# Patient Record
Sex: FEMALE | Race: WHITE | NOT HISPANIC OR LATINO | URBAN - METROPOLITAN AREA
[De-identification: names, ages, dates, MRNs, and addresses within clinical notes are randomized per-mention and may not be internally consistent; named-entity substitution may affect disease eponyms.]

---

## 2017-10-25 ENCOUNTER — APPOINTMENT (OUTPATIENT)
Dept: WOMENS IMAGING | Facility: HOSPITAL | Age: 52
End: 2017-10-25

## 2017-10-25 PROCEDURE — 77067 SCR MAMMO BI INCL CAD: CPT | Performed by: RADIOLOGY

## 2017-10-25 PROCEDURE — 77063 BREAST TOMOSYNTHESIS BI: CPT | Performed by: RADIOLOGY

## 2018-11-15 ENCOUNTER — APPOINTMENT (OUTPATIENT)
Dept: WOMENS IMAGING | Facility: HOSPITAL | Age: 53
End: 2018-11-15

## 2018-11-15 PROCEDURE — 77063 BREAST TOMOSYNTHESIS BI: CPT | Performed by: RADIOLOGY

## 2018-11-15 PROCEDURE — 77067 SCR MAMMO BI INCL CAD: CPT | Performed by: RADIOLOGY

## 2019-04-16 ENCOUNTER — OFFICE (OUTPATIENT)
Dept: URBAN - METROPOLITAN AREA CLINIC 75 | Facility: CLINIC | Age: 54
End: 2019-04-16

## 2019-04-16 VITALS
RESPIRATION RATE: 14 BRPM | SYSTOLIC BLOOD PRESSURE: 140 MMHG | DIASTOLIC BLOOD PRESSURE: 93 MMHG | HEART RATE: 97 BPM | HEIGHT: 67 IN | WEIGHT: 199 LBS

## 2019-04-16 DIAGNOSIS — K57.30 DIVERTICULOSIS OF LARGE INTESTINE WITHOUT PERFORATION OR ABS: ICD-10-CM

## 2019-04-16 DIAGNOSIS — R19.4 CHANGE IN BOWEL HABIT: ICD-10-CM

## 2019-04-16 DIAGNOSIS — K58.9 IRRITABLE BOWEL SYNDROME WITHOUT DIARRHEA: ICD-10-CM

## 2019-04-16 DIAGNOSIS — K30 FUNCTIONAL DYSPEPSIA: ICD-10-CM

## 2019-04-16 DIAGNOSIS — R93.3 ABNORMAL FINDINGS ON DIAGNOSTIC IMAGING OF OTHER PARTS OF DI: ICD-10-CM

## 2019-04-16 DIAGNOSIS — R10.84 GENERALIZED ABDOMINAL PAIN: ICD-10-CM

## 2019-04-16 DIAGNOSIS — K21.9 GASTRO-ESOPHAGEAL REFLUX DISEASE WITHOUT ESOPHAGITIS: ICD-10-CM

## 2019-04-16 PROCEDURE — 99204 OFFICE O/P NEW MOD 45 MIN: CPT | Performed by: NURSE PRACTITIONER

## 2019-07-03 ENCOUNTER — OFFICE (OUTPATIENT)
Dept: URBAN - METROPOLITAN AREA CLINIC 75 | Facility: CLINIC | Age: 54
End: 2019-07-03

## 2019-07-03 VITALS
HEIGHT: 67 IN | HEART RATE: 91 BPM | WEIGHT: 204 LBS | DIASTOLIC BLOOD PRESSURE: 80 MMHG | SYSTOLIC BLOOD PRESSURE: 134 MMHG

## 2019-07-03 DIAGNOSIS — K21.9 GASTRO-ESOPHAGEAL REFLUX DISEASE WITHOUT ESOPHAGITIS: ICD-10-CM

## 2019-07-03 DIAGNOSIS — K58.9 IRRITABLE BOWEL SYNDROME WITHOUT DIARRHEA: ICD-10-CM

## 2019-07-03 DIAGNOSIS — K22.2 ESOPHAGEAL OBSTRUCTION: ICD-10-CM

## 2019-07-03 DIAGNOSIS — R10.84 GENERALIZED ABDOMINAL PAIN: ICD-10-CM

## 2019-07-03 DIAGNOSIS — K57.30 DIVERTICULOSIS OF LARGE INTESTINE WITHOUT PERFORATION OR ABS: ICD-10-CM

## 2019-07-03 DIAGNOSIS — R13.10 DYSPHAGIA, UNSPECIFIED: ICD-10-CM

## 2019-07-03 DIAGNOSIS — K30 FUNCTIONAL DYSPEPSIA: ICD-10-CM

## 2019-07-03 DIAGNOSIS — K76.0 FATTY (CHANGE OF) LIVER, NOT ELSEWHERE CLASSIFIED: ICD-10-CM

## 2019-07-03 PROCEDURE — 99214 OFFICE O/P EST MOD 30 MIN: CPT | Performed by: INTERNAL MEDICINE

## 2019-07-03 RX ORDER — LUBIPROSTONE 8 UG/1
16 CAPSULE, GELATIN COATED ORAL
Qty: 60 | Refills: 5 | Status: COMPLETED
Start: 2019-07-03 | End: 2019-09-11

## 2019-07-26 ENCOUNTER — OFFICE (OUTPATIENT)
Dept: URBAN - METROPOLITAN AREA CLINIC 46 | Facility: CLINIC | Age: 54
End: 2019-07-26
Payer: COMMERCIAL

## 2019-07-26 VITALS — HEIGHT: 67 IN

## 2019-07-26 DIAGNOSIS — K76.0 FATTY (CHANGE OF) LIVER, NOT ELSEWHERE CLASSIFIED: ICD-10-CM

## 2019-07-26 PROCEDURE — 91200 LIVER ELASTOGRAPHY: CPT | Performed by: INTERNAL MEDICINE

## 2019-09-11 ENCOUNTER — OFFICE (OUTPATIENT)
Dept: URBAN - METROPOLITAN AREA CLINIC 75 | Facility: CLINIC | Age: 54
End: 2019-09-11

## 2019-09-11 VITALS
HEIGHT: 67 IN | SYSTOLIC BLOOD PRESSURE: 130 MMHG | WEIGHT: 198 LBS | HEART RATE: 80 BPM | DIASTOLIC BLOOD PRESSURE: 80 MMHG

## 2019-09-11 DIAGNOSIS — R13.10 DYSPHAGIA, UNSPECIFIED: ICD-10-CM

## 2019-09-11 DIAGNOSIS — K30 FUNCTIONAL DYSPEPSIA: ICD-10-CM

## 2019-09-11 DIAGNOSIS — K22.2 ESOPHAGEAL OBSTRUCTION: ICD-10-CM

## 2019-09-11 DIAGNOSIS — K21.9 GASTRO-ESOPHAGEAL REFLUX DISEASE WITHOUT ESOPHAGITIS: ICD-10-CM

## 2019-09-11 DIAGNOSIS — K76.0 FATTY (CHANGE OF) LIVER, NOT ELSEWHERE CLASSIFIED: ICD-10-CM

## 2019-09-11 DIAGNOSIS — K58.9 IRRITABLE BOWEL SYNDROME WITHOUT DIARRHEA: ICD-10-CM

## 2019-09-11 DIAGNOSIS — K57.30 DIVERTICULOSIS OF LARGE INTESTINE WITHOUT PERFORATION OR ABS: ICD-10-CM

## 2019-09-11 DIAGNOSIS — R10.84 GENERALIZED ABDOMINAL PAIN: ICD-10-CM

## 2019-09-11 PROCEDURE — 99214 OFFICE O/P EST MOD 30 MIN: CPT | Performed by: INTERNAL MEDICINE

## 2019-11-18 ENCOUNTER — APPOINTMENT (OUTPATIENT)
Dept: WOMENS IMAGING | Facility: HOSPITAL | Age: 54
End: 2019-11-18

## 2019-11-18 PROCEDURE — 77067 SCR MAMMO BI INCL CAD: CPT | Performed by: RADIOLOGY

## 2022-04-19 ENCOUNTER — APPOINTMENT (OUTPATIENT)
Dept: WOMENS IMAGING | Facility: HOSPITAL | Age: 57
End: 2022-04-19

## 2022-04-19 PROCEDURE — 77063 BREAST TOMOSYNTHESIS BI: CPT | Performed by: RADIOLOGY

## 2022-04-19 PROCEDURE — 77067 SCR MAMMO BI INCL CAD: CPT | Performed by: RADIOLOGY

## 2022-12-15 ENCOUNTER — OFFICE (OUTPATIENT)
Dept: URBAN - METROPOLITAN AREA CLINIC 5 | Facility: CLINIC | Age: 57
End: 2022-12-15
Payer: COMMERCIAL

## 2022-12-15 VITALS
OXYGEN SATURATION: 98 % | SYSTOLIC BLOOD PRESSURE: 132 MMHG | DIASTOLIC BLOOD PRESSURE: 80 MMHG | HEART RATE: 80 BPM | WEIGHT: 198 LBS | HEIGHT: 67 IN

## 2022-12-15 DIAGNOSIS — K30 FUNCTIONAL DYSPEPSIA: ICD-10-CM

## 2022-12-15 DIAGNOSIS — K57.30 DIVERTICULOSIS OF LARGE INTESTINE WITHOUT PERFORATION OR ABS: ICD-10-CM

## 2022-12-15 DIAGNOSIS — K58.9 IRRITABLE BOWEL SYNDROME WITHOUT DIARRHEA: ICD-10-CM

## 2022-12-15 DIAGNOSIS — K21.9 GASTRO-ESOPHAGEAL REFLUX DISEASE WITHOUT ESOPHAGITIS: ICD-10-CM

## 2022-12-15 DIAGNOSIS — K76.0 FATTY (CHANGE OF) LIVER, NOT ELSEWHERE CLASSIFIED: ICD-10-CM

## 2022-12-15 DIAGNOSIS — L73.2 HIDRADENITIS SUPPURATIVA: ICD-10-CM

## 2022-12-15 PROCEDURE — 99204 OFFICE O/P NEW MOD 45 MIN: CPT | Performed by: INTERNAL MEDICINE

## 2023-10-26 ENCOUNTER — OFFICE (OUTPATIENT)
Dept: URBAN - METROPOLITAN AREA CLINIC 5 | Facility: CLINIC | Age: 58
End: 2023-10-26
Payer: COMMERCIAL

## 2023-10-26 VITALS
OXYGEN SATURATION: 97 % | DIASTOLIC BLOOD PRESSURE: 73 MMHG | SYSTOLIC BLOOD PRESSURE: 124 MMHG | WEIGHT: 200 LBS | HEIGHT: 67 IN | HEART RATE: 83 BPM

## 2023-10-26 DIAGNOSIS — K57.30 DIVERTICULOSIS OF LARGE INTESTINE WITHOUT PERFORATION OR ABS: ICD-10-CM

## 2023-10-26 DIAGNOSIS — K76.0 FATTY (CHANGE OF) LIVER, NOT ELSEWHERE CLASSIFIED: ICD-10-CM

## 2023-10-26 DIAGNOSIS — R10.13 EPIGASTRIC PAIN: ICD-10-CM

## 2023-10-26 DIAGNOSIS — R10.11 RIGHT UPPER QUADRANT PAIN: ICD-10-CM

## 2023-10-26 DIAGNOSIS — K21.9 GASTRO-ESOPHAGEAL REFLUX DISEASE WITHOUT ESOPHAGITIS: ICD-10-CM

## 2023-10-26 DIAGNOSIS — R10.84 GENERALIZED ABDOMINAL PAIN: ICD-10-CM

## 2023-10-26 DIAGNOSIS — K58.9 IRRITABLE BOWEL SYNDROME WITHOUT DIARRHEA: ICD-10-CM

## 2023-10-26 PROCEDURE — 99213 OFFICE O/P EST LOW 20 MIN: CPT | Performed by: INTERNAL MEDICINE

## 2023-10-26 RX ORDER — ESOMEPRAZOLE MAGNESIUM 40 MG/1
40 CAPSULE, DELAYED RELEASE ORAL
Qty: 30 | Refills: 6 | Status: ACTIVE
Start: 2023-10-26

## 2023-11-13 ENCOUNTER — OFFICE (OUTPATIENT)
Dept: URBAN - METROPOLITAN AREA CLINIC 46 | Facility: CLINIC | Age: 58
End: 2023-11-13
Payer: COMMERCIAL

## 2023-11-13 VITALS — HEIGHT: 67 IN | HEIGHT: 67 IN | HEIGHT: 67 IN

## 2023-11-13 DIAGNOSIS — K76.0 FATTY (CHANGE OF) LIVER, NOT ELSEWHERE CLASSIFIED: ICD-10-CM

## 2023-11-13 PROCEDURE — 76981 USE PARENCHYMA: CPT | Performed by: INTERNAL MEDICINE

## 2023-11-13 PROCEDURE — 91200 LIVER ELASTOGRAPHY: CPT | Performed by: INTERNAL MEDICINE

## 2023-12-14 ENCOUNTER — OFFICE (OUTPATIENT)
Dept: URBAN - METROPOLITAN AREA CLINIC 5 | Facility: CLINIC | Age: 58
End: 2023-12-14
Payer: COMMERCIAL

## 2023-12-14 VITALS
DIASTOLIC BLOOD PRESSURE: 70 MMHG | OXYGEN SATURATION: 98 % | HEART RATE: 82 BPM | WEIGHT: 196 LBS | SYSTOLIC BLOOD PRESSURE: 118 MMHG | HEIGHT: 67 IN

## 2023-12-14 DIAGNOSIS — K21.9 GASTRO-ESOPHAGEAL REFLUX DISEASE WITHOUT ESOPHAGITIS: ICD-10-CM

## 2023-12-14 DIAGNOSIS — R10.84 GENERALIZED ABDOMINAL PAIN: ICD-10-CM

## 2023-12-14 DIAGNOSIS — K57.30 DIVERTICULOSIS OF LARGE INTESTINE WITHOUT PERFORATION OR ABS: ICD-10-CM

## 2023-12-14 DIAGNOSIS — R10.13 EPIGASTRIC PAIN: ICD-10-CM

## 2023-12-14 DIAGNOSIS — K76.0 FATTY (CHANGE OF) LIVER, NOT ELSEWHERE CLASSIFIED: ICD-10-CM

## 2023-12-14 DIAGNOSIS — K58.9 IRRITABLE BOWEL SYNDROME WITHOUT DIARRHEA: ICD-10-CM

## 2023-12-14 PROCEDURE — 99213 OFFICE O/P EST LOW 20 MIN: CPT | Performed by: INTERNAL MEDICINE

## 2024-02-22 ENCOUNTER — OFFICE VISIT (OUTPATIENT)
Dept: SURGERY | Facility: CLINIC | Age: 59
End: 2024-02-22
Payer: COMMERCIAL

## 2024-02-22 ENCOUNTER — TELEPHONE (OUTPATIENT)
Dept: SURGERY | Facility: CLINIC | Age: 59
End: 2024-02-22
Payer: COMMERCIAL

## 2024-02-22 VITALS
HEIGHT: 60 IN | DIASTOLIC BLOOD PRESSURE: 68 MMHG | SYSTOLIC BLOOD PRESSURE: 124 MMHG | BODY MASS INDEX: 38.32 KG/M2 | WEIGHT: 195.2 LBS

## 2024-02-22 DIAGNOSIS — L73.2 VULVAL HIDRADENITIS SUPPURATIVA: Primary | ICD-10-CM

## 2024-02-22 PROCEDURE — 99204 OFFICE O/P NEW MOD 45 MIN: CPT | Performed by: SURGERY

## 2024-02-22 RX ORDER — GABAPENTIN 300 MG/1
CAPSULE ORAL
COMMUNITY

## 2024-02-22 RX ORDER — SECUKINUMAB 300 MG/2ML
INJECTION SUBCUTANEOUS
COMMUNITY
Start: 2024-02-01

## 2024-02-22 RX ORDER — CELECOXIB 100 MG/1
200 CAPSULE ORAL 2 TIMES DAILY PRN
COMMUNITY

## 2024-02-22 RX ORDER — CLOBETASOL PROPIONATE 0.5 MG/G
CREAM TOPICAL
COMMUNITY
Start: 2024-01-15

## 2024-02-22 RX ORDER — AMLODIPINE BESYLATE 5 MG/1
1 TABLET ORAL DAILY
COMMUNITY
Start: 2024-02-13

## 2024-02-22 RX ORDER — FLUVOXAMINE MALEATE 100 MG
100 TABLET ORAL EVERY 12 HOURS
COMMUNITY

## 2024-02-22 RX ORDER — LORAZEPAM 1 MG/1
1 TABLET ORAL 3 TIMES DAILY
COMMUNITY
Start: 2024-01-20

## 2024-02-22 RX ORDER — LAMOTRIGINE 150 MG/1
TABLET ORAL
COMMUNITY
Start: 2023-12-14

## 2024-02-22 RX ORDER — FLUCONAZOLE 200 MG/1
TABLET ORAL
COMMUNITY
Start: 2024-02-19

## 2024-02-22 NOTE — TELEPHONE ENCOUNTER
----- Message from Florence Leal MD sent at 2/22/2024  9:58 AM EST -----  Can someone call the patient and see if she can come to the office earlier than her 4:30 scheduled time? I need to go  luis at his  and would like to be done with clinic by 4. So if she could come anytime between 1-3:30 that would be great.

## 2024-02-22 NOTE — TELEPHONE ENCOUNTER
OK FOR HUB TO SHARE:    Left message informing patient we needed to have her come in earlier today than 4:30 pm. I moved patient's appointment to 2 pm. I did state that if she could not make that time to call our office back to reschedule.

## 2024-03-13 PROBLEM — E66.9 OBESITY (BMI 35.0-39.9 WITHOUT COMORBIDITY): Status: ACTIVE | Noted: 2024-03-13

## 2024-03-13 NOTE — PROGRESS NOTES
General Surgery  Initial Office Visit    CC: Hidradenitis suppurativa    HPI: The patient is a pleasant 58 y.o. year-old lady who presents today for evaluation of pubic/inguinal hidradenitis suppurativa which has been a chronic issue for her now for over 15 years.  She developed frequent abscesses/boils within the genitourinary region and always thought they were isolated events until 5 years ago when she was formally diagnosed with hidradenitis and referred to [facility].  She sees IAN Cosme.  She was on Humira for about 1 year and had moderate relief but was recently switched over to Cosentyx for insurance reasons.  She sporadically uses topical ketoconazole/hydrocortisone cream for itching of the genitourinary region.  She has a few lesions that have been refractory to medical management that she wishes to have excised as they have failed to improve with conservative measures.    Past Medical History:   Obesity (BMI 38)  Anemia  Asthma  COPD  Hypertension    Past Surgical History:   Open umbilical hernia repair  Cholecystectomy  Colonoscopy    Medications:   Amlodipine 5 mg daily  Celebrex 100 mg twice daily as needed for pain  Clobetasol 0.05% cream as needed  Cosentyx subcutaneous injection monthly  Nexium 20 mg daily  Diflucan 200 mg as needed for yeast infection  Fluvoxamine 100 mg twice daily  Gabapentin 600 mg nightly  Hydrocortisone 2.5% cream as needed  Lamictal 150 mg twice daily  Ativan 1 mg 3 times daily    Allergies: Penicillin (rash), cephalexin (dizziness)    Family History: Mother with history of depression, father with history of coronary artery disease, brother with history of sarcoma metastasized to the lungs    Social History: , smokes less than 1 pack/day cigarettes for the last 30 years, no current alcohol use    ROS:   Constitutional: Negative for fevers or chills  HENT: Negative for hearing loss or runny nose  Eyes: Negative for vision changes or  scleral icterus  Respiratory: Negative for cough or shortness of breath  Cardiovascular: Negative for chest pain or heart palpitations  Gastrointestinal: Negative for abdominal distension, nausea, vomiting, constipation, melena, or hematochezia  Genitourinary: Negative for hematuria or dysuria  Musculoskeletal: Negative for joint swelling or gait instability  Neurologic: Negative for tremors or seizures  Psychiatric: Negative for suicidal ideations or agitation  All other systems reviewed and negative    Physical Exam:  Height: 152 cm  Weight: 88 kg  BMI: 38.12  General: No acute distress, well-nourished & well-developed  HEAD: normocephalic, atraumatic  EYES: normal conjunctiva, sclera anicteric  EARS: grossly normal hearing  NECK: supple, no thyromegaly  CARDIOVASCULAR: regular rate and rhythm  RESPIRATORY: clear to auscultation bilaterally  GASTROINTESTINAL: soft, nontender, non-distended  MUSCULOSKELETAL: normal gait and station. No gross extremity abnormalities  PSYCHIATRIC: oriented x3, normal mood and affect  SKIN: Hernandez stage II/III disease of the genitourinary region primarily within the inguinal folds bilaterally    Class 2 Severe Obesity (BMI >=35 and <=39.9). Obesity-related health conditions include the following: hypertension and hidradenitis suppurativa . Obesity is unchanged. BMI is is above average; BMI management plan is completed. We discussed low calorie, low carb based diet program and portion control.      IMAGING:  None    ASSESSMENT & PLAN  Mrs. Fontanez is a 58-year-old lady with hidradenitis suppurativa of the genitourinary region refractory to medical management.  I would recommend scheduling her for wide local excision in the operating room under general anesthesia.  I cautioned her on the risks of the surgery which are primarily related to wound healing difficulties secondary to the chronic inflammatory process of hidradenitis.  She understands that at some point in her healing, her  incisions will likely split apart superficially and have to heal by secondary intention.  This can be a nuisance and require application of gauze to wick away moisture, but eventually the wounds will heal completely.  She would like to think on her options and will call back to schedule surgery whenever she is ready.    Florence Leal MD  General, Robotic, and Endoscopic Surgery  [facility]    [address], [address]  [place] [address]  P: [phone]  F: [phone]

## 2025-04-09 VITALS
HEART RATE: 71 BPM | OXYGEN SATURATION: 94 % | HEIGHT: 67 IN | DIASTOLIC BLOOD PRESSURE: 70 MMHG | SYSTOLIC BLOOD PRESSURE: 116 MMHG | WEIGHT: 186 LBS

## 2025-04-10 ENCOUNTER — OFFICE (AMBULATORY)
Dept: URBAN - METROPOLITAN AREA CLINIC 5 | Facility: CLINIC | Age: 60
End: 2025-04-10

## 2025-04-10 DIAGNOSIS — R10.11 RIGHT UPPER QUADRANT PAIN: ICD-10-CM

## 2025-04-10 DIAGNOSIS — K22.2 ESOPHAGEAL OBSTRUCTION: ICD-10-CM

## 2025-04-10 DIAGNOSIS — L73.2 HIDRADENITIS SUPPURATIVA: ICD-10-CM

## 2025-04-10 DIAGNOSIS — K57.30 DIVERTICULOSIS OF LARGE INTESTINE WITHOUT PERFORATION OR ABS: ICD-10-CM

## 2025-04-10 DIAGNOSIS — R13.10 DYSPHAGIA, UNSPECIFIED: ICD-10-CM

## 2025-04-10 DIAGNOSIS — R10.84 GENERALIZED ABDOMINAL PAIN: ICD-10-CM

## 2025-04-10 DIAGNOSIS — K58.9 IRRITABLE BOWEL SYNDROME, UNSPECIFIED: ICD-10-CM

## 2025-04-10 DIAGNOSIS — K76.0 FATTY (CHANGE OF) LIVER, NOT ELSEWHERE CLASSIFIED: ICD-10-CM

## 2025-04-10 PROCEDURE — 99214 OFFICE O/P EST MOD 30 MIN: CPT | Performed by: INTERNAL MEDICINE

## 2025-04-10 RX ORDER — VONOPRAZAN FUMARATE 26.72 MG/1
20 TABLET ORAL
Qty: 30 | Refills: 6 | Status: ACTIVE
Start: 2025-04-10

## 2025-04-10 RX ORDER — SCOLOPAMINE TRANSDERMAL SYSTEM 1 MG/1
PATCH, EXTENDED RELEASE TRANSDERMAL
Qty: 10 | Refills: 3 | Status: ACTIVE
Start: 2025-04-10

## 2025-04-18 ENCOUNTER — AMBULATORY SURGICAL CENTER (AMBULATORY)
Dept: URBAN - METROPOLITAN AREA SURGERY 17 | Facility: SURGERY | Age: 60
End: 2025-04-18
Payer: COMMERCIAL

## 2025-04-18 ENCOUNTER — OFFICE (AMBULATORY)
Dept: URBAN - METROPOLITAN AREA PATHOLOGY 4 | Facility: PATHOLOGY | Age: 60
End: 2025-04-18
Payer: COMMERCIAL

## 2025-04-18 VITALS
OXYGEN SATURATION: 97 % | OXYGEN SATURATION: 99 % | SYSTOLIC BLOOD PRESSURE: 150 MMHG | SYSTOLIC BLOOD PRESSURE: 111 MMHG | SYSTOLIC BLOOD PRESSURE: 121 MMHG | HEART RATE: 72 BPM | OXYGEN SATURATION: 87 % | DIASTOLIC BLOOD PRESSURE: 70 MMHG | HEART RATE: 74 BPM | HEART RATE: 81 BPM | DIASTOLIC BLOOD PRESSURE: 57 MMHG | RESPIRATION RATE: 16 BRPM | SYSTOLIC BLOOD PRESSURE: 147 MMHG | RESPIRATION RATE: 9 BRPM | TEMPERATURE: 98.2 F | DIASTOLIC BLOOD PRESSURE: 48 MMHG | RESPIRATION RATE: 21 BRPM | DIASTOLIC BLOOD PRESSURE: 66 MMHG | OXYGEN SATURATION: 94 % | DIASTOLIC BLOOD PRESSURE: 51 MMHG | RESPIRATION RATE: 11 BRPM | DIASTOLIC BLOOD PRESSURE: 50 MMHG | HEART RATE: 75 BPM | DIASTOLIC BLOOD PRESSURE: 58 MMHG | OXYGEN SATURATION: 96 % | SYSTOLIC BLOOD PRESSURE: 130 MMHG | OXYGEN SATURATION: 95 % | WEIGHT: 186 LBS | HEART RATE: 73 BPM | OXYGEN SATURATION: 91 % | TEMPERATURE: 98.3 F | RESPIRATION RATE: 19 BRPM | HEIGHT: 67 IN

## 2025-04-18 DIAGNOSIS — K30 FUNCTIONAL DYSPEPSIA: ICD-10-CM

## 2025-04-18 DIAGNOSIS — K22.2 ESOPHAGEAL OBSTRUCTION: ICD-10-CM

## 2025-04-18 DIAGNOSIS — K29.50 UNSPECIFIED CHRONIC GASTRITIS WITHOUT BLEEDING: ICD-10-CM

## 2025-04-18 DIAGNOSIS — K21.9 GASTRO-ESOPHAGEAL REFLUX DISEASE WITHOUT ESOPHAGITIS: ICD-10-CM

## 2025-04-18 DIAGNOSIS — R10.84 GENERALIZED ABDOMINAL PAIN: ICD-10-CM

## 2025-04-18 PROBLEM — K29.70 GASTRITIS, UNSPECIFIED, WITHOUT BLEEDING: Status: ACTIVE | Noted: 2025-04-18

## 2025-04-18 PROCEDURE — 43249 ESOPH EGD DILATION <30 MM: CPT | Performed by: INTERNAL MEDICINE

## 2025-04-18 PROCEDURE — 88305 TISSUE EXAM BY PATHOLOGIST: CPT | Performed by: PATHOLOGY

## 2025-04-18 PROCEDURE — 88342 IMHCHEM/IMCYTCHM 1ST ANTB: CPT | Performed by: PATHOLOGY

## 2025-04-18 PROCEDURE — 43239 EGD BIOPSY SINGLE/MULTIPLE: CPT | Mod: 59 | Performed by: INTERNAL MEDICINE
